# Patient Record
Sex: FEMALE | Race: WHITE | NOT HISPANIC OR LATINO | URBAN - METROPOLITAN AREA
[De-identification: names, ages, dates, MRNs, and addresses within clinical notes are randomized per-mention and may not be internally consistent; named-entity substitution may affect disease eponyms.]

---

## 2018-07-20 ENCOUNTER — EMERGENCY (EMERGENCY)
Facility: HOSPITAL | Age: 63
LOS: 0 days | Discharge: HOME | End: 2018-07-20
Admitting: PHYSICIAN ASSISTANT

## 2018-07-20 VITALS
DIASTOLIC BLOOD PRESSURE: 89 MMHG | SYSTOLIC BLOOD PRESSURE: 173 MMHG | HEART RATE: 81 BPM | TEMPERATURE: 96 F | OXYGEN SATURATION: 97 % | RESPIRATION RATE: 18 BRPM
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
## 2018-07-20 DIAGNOSIS — E78.5 HYPERLIPIDEMIA, UNSPECIFIED: ICD-10-CM
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
## 2018-07-20 DIAGNOSIS — F17.200 NICOTINE DEPENDENCE, UNSPECIFIED, UNCOMPLICATED: ICD-10-CM
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
## 2018-07-20 DIAGNOSIS — M25.561 PAIN IN RIGHT KNEE: ICD-10-CM

## 2018-07-20 RX ORDER — IBUPROFEN 200 MG
600 TABLET ORAL ONCE
Qty: 0 | Refills: 0 | Status: COMPLETED | OUTPATIENT
Start: 2018-07-20 | End: 2018-07-20

## 2018-07-20 RX ADMIN — Medication 600 MILLIGRAM(S): at 11:34

## 2018-07-20 NOTE — ED PROVIDER NOTE - PHYSICAL EXAMINATION
CONSTITUTIONAL: Well-appearing; well-nourished; in no apparent distress.   CARDIOVASCULAR: Normal S1, S2; no murmurs, rubs, or gallops.   RESPIRATORY: Normal chest excursion with respiration; breath sounds clear and equal bilaterally; no wheezes, rhonchi, or rales.  MS: No evidence of trauma or deformity. + full flexion/extension of knee. + mild lateral right patella tenderness. Normal gait. Pedal pulses intact.   SKIN: No abrasions, ecchymosis
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
## 2018-07-20 NOTE — ED PROVIDER NOTE - NS ED ROS FT
Constitutional: no fever, chills, no recent weight loss, change in appetite or malaise  Cardiac: No chest pain, SOB or edema.  Respiratory: No cough or respiratory distress  GI: No nausea, vomiting, diarrhea or abdominal pain.  MS: + right knee pain  Neuro: No decreased sensation  Skin: No skin rash, bruising, abrasions  Except as documented in the HPI, all other systems are negative.

## 2018-07-20 NOTE — ED PROVIDER NOTE - MEDICAL DECISION MAKING DETAILS
Patient here for right knee pain s/p injury, xray unremarkable, will refer to ortho. Offered knee immobilizer or ace wrap but patient declined.

## 2018-07-20 NOTE — ED PROVIDER NOTE - PROGRESS NOTE DETAILS
MARIA A Cohen: I was directly involved in the care and management of this patient while supervising PA Veronica Garrison

## 2018-07-24 PROBLEM — M25.561 PAIN IN RIGHT KNEE: Chronic | Status: ACTIVE | Noted: 2018-07-20

## 2018-07-31 ENCOUNTER — OUTPATIENT (OUTPATIENT)
Dept: OUTPATIENT SERVICES | Facility: HOSPITAL | Age: 63
LOS: 1 days | Discharge: HOME | End: 2018-07-31

## 2018-07-31 DIAGNOSIS — F02.81 DEMENTIA IN OTHER DISEASES CLASSIFIED ELSEWHERE, UNSPECIFIED SEVERITY, WITH BEHAVIORAL DISTURBANCE: ICD-10-CM

## 2018-07-31 DIAGNOSIS — F06.34 MOOD DISORDER DUE TO KNOWN PHYSIOLOGICAL CONDITION WITH MIXED FEATURES: ICD-10-CM

## 2021-02-07 NOTE — ED ADULT TRIAGE NOTE - STATUS:
[facility]  Inpatient Services  Wound and Skin Care Evaluation    Admission Date: 2/1/2021     Last order of IP CONSULT TO WOUND CARE was found on 2/2/2021 from Hospital Encounter on 2/1/2021     HPI, PMH, SH: Reviewed    Past Surgical History:   Procedure Laterality Date   • IRRIGATION & DEBRIDEMENT ORTHO Right 2/2/2021    Procedure: IRRIGATION AND DEBRIDEMENT, WOUND-KNEE;  Surgeon: Kush Sol M.D.;  Location: SURGERY [facility];  Service: Orthopedics   • INCISION AND DRAINAGE GENERAL Right 2/2/2021    Procedure: INCISION AND DRAINAGE- arthrocentesis right knee;  Surgeon: Kush Sol M.D.;  Location: [facility];  Service: Orthopedics   • INCISION AND DRAINAGE GENERAL Right 12/19/2020    Procedure: INCISION AND DRAINAGE;  Surgeon: Marc Chowdhury M.D.;  Location: [facility];  Service: Orthopedics   • IRRIGATION & DEBRIDEMENT ORTHO Right 10/27/2020    Procedure: IRRIGATION AND DEBRIDEMENT, WOUND - KNEE OPEN;  Surgeon: Elijah Faulkner M.D.;  Location: [facility];  Service: Orthopedics   • FLEXOR TENDON REPAIR Left 4/3/2019    Procedure: FLEXOR TENDON REPAIR- SMALL FINGER VS;  Surgeon: Marc Chowdhury M.D.;  Location: [facility];  Service: Orthopedics   • TENDON TRANSFER Left 4/3/2019    Procedure: TENDON TRANSFER;  Surgeon: Marc Chowdhury M.D.;  Location: [facility];  Service: Orthopedics   • COLONOSCOPY  11/10/2018    Procedure: COLONOSCOPY;  Surgeon: Ganesh Peacock M.D.;  Location: [facility];  Service: Gastroenterology   • NASAL POLYPECTOMY Bilateral 10/6/2015    Procedure: NASAL POLYPECTOMY WITH SCOTT ENDOSCOPIC NASAL DEBRIDEMENT;  Surgeon: Param Maurer M.D.;  Location: SURGERY [facility];  Service:    • CYSTOSCOPY  2/1/2010    Performed by ANGIE VERDUZCO at [facility]   • RETROGRADES  2/1/2010    Performed by ANGIE VERDUZCO at [facility]   •  PYELOGRAM  2010    Performed by ANGIE VERDUZCO at SUR[facility]ERY [facility]   • URETEROSCOPY  2010    Performed by ANGIE VERDUZCO at SUR[facility]ERY [facility]   • NEPHRECTOMY LAPAROSCOPIC      left kidney   • TESTICLE EXPLORATION     • PB REMV 2ND CATARACT,CORN-SCLER SECTN     • TONSILLECTOMY       Social History     Tobacco Use   • Smoking status: Former Smoker     Packs/day: 1.00     Years: 40.00     Pack years: 40.00     Types: Cigarettes     Quit date: 1980     Years since quittin.1   • Smokeless tobacco: Never Used   • Tobacco comment: 1 pk a day for 40 yrs   Substance Use Topics   • Alcohol use: No     Alcohol/week: 0.0 oz     Chief Complaint   Patient presents with   • Leg Swelling     has had infection in R leg since feb   • Fever     Diagnosis: Cellulitis [L03.90]    Unit where seen by Wound Team: [id number]/     WOUND CONSULT/FOLLOW UP RELATED TO:  NPWT change to right knee per protocol    WOUND HISTORY:  Patient admitted with complaints of pain to right knee, over several months he has had multiple procedures completed on the wound, followed by wound complications. States this was his 4th surgery on this knee. Had I&D with MD Sol on  and NPWT was placed then.      WOUND ASSESSMENT/LDA          Negative Pressure Wound Therapy 21 Knee Right (Active)   NPWT Pump Mode / Pressure Setting Continuous;125 mmHg    Dressing Type Black Foam (Regular);Medium    Number of Foam Pieces Used 4    Canister Changed No    NEXT Dressing Change/Treatment Date 21              Wound 10/27/20 Incision Knee Right xeroform, 4x4, ace (Active)       Wound 21 Incision Knee Right s/p wound I&D and arthrocentesis with Dr Sol 21 (Active)   Wound Image    21 1600   Site Assessment Pink    Periwound Assessment Maceration;Pink    Margins Defined edges;Unattached edges    Closure Secondary intention    Drainage Amount Small    Drainage Description Serosanguineous       Treatments Cleansed;Site care    Wound Cleansing Approved Wound Cleanser    Periwound Protectant Drape;Hydrofiber;Skin Protectant Wipes to Periwound;Mepitel    Dressing Cleansing/Solutions Not Applicable    Dressing Options Wound Vac    Dressing Changed Changed    Dressing Status Clean;Dry;Intact    Dressing Change/Treatment Frequency Tuesday, Thursday, Saturday, and As Needed    NEXT Dressing Change/Treatment Date 02/09/21    NEXT Weekly Photo (Inpatient Only) 02/11/21    Non-staged Wound Description Full thickness    Shape irregular    Wound Odor None    Exposed Structures Fascia          MEASUREMENTS 2/4/21:  Superior wound: 3 x 1.4 x 0.6CM  Inferior wound: 3 x 1.4 x 0.8CM  Wounds communicate with eachother. Circumferential undermining present for both wounds, ranging from 0.5CM - 3.7CM (deepest point was inferior wound at 0900.          Vascular:    NIKKY:   No results found.    Lab Values:    Lab Results   Component Value Date/Time    WBC 7.1 02/05/2021 05:53 AM    RBC 3.13 (L) 02/05/2021 05:53 AM    HEMOGLOBIN 10.0 (L) 02/05/2021 05:53 AM    HEMATOCRIT 32.4 (L) 02/05/2021 05:53 AM    CREACTPROT 34.20 (H) 02/02/2021 04:59 AM    SEDRATEWES 25 (H) 01/05/2021 10:35 AM        Culture Results show:  Recent Results (from the past 720 hour(s))   CULTURE WOUND W/ GRAM STAIN    Collection Time: 02/02/21  8:33 AM    Specimen: Wound   Result Value Ref Range    Significant Indicator POS (POS)     Source WND     Site Right Prepatellar Fluid     Culture Result - (A)     Gram Stain Result Many WBCs.  Moderate Gram positive cocci.       Culture Result (A)      Staphylococcus aureus  Heavy growth  See previous culture for sensitivity report.         Pain Level/Medicated:  no premed. Tolerated well. 2/10.       INTERVENTIONS BY WOUND TEAM:  Chart and images reviewed. Discussed with bedside RN. This RN in to assess patient. Performed standard wound care which includes appropriate positioning, dressing removal and non-selective  debridement. Pictures and measurements obtained weekly if/when required.  Preparation for Dressing removal: Dressing soaked with wound cleanser  Cleansed with:  wound cleanser and gauze.  Sharp debridement: n/a  Dona wound: Cleansed with wound cleanser and gauze, Prepped with benzoin. Applied hydrofiber silver to macerated area around incisions and open wounds. Mepitel applied over incisions. Drape then applied to periwound over hydrofiber silver.   Primary Dressing: Applied one piece of regular black foam into the superior wound and tucked into some undermining, and applied another separate piece of regular black foam to inferior wound bed and tucked into undermining. Then took one piece of foam and applied over top the entire suture line, secured with drape.   Secondary (Outer) Dressing: Hole cut at distal aspect. 1 piece of foam applied as a button and trac pad applied. Suction obtained at 125mmhg continuous suction, mepilex under tubing. Reapplied immobilizer.     Interdisciplinary consultation: Patient, Bedside RN (Julia Jhaveri)    EVALUATION / RATIONALE FOR TREATMENT:  Most Recent Date:  2/6/21: Wound appears to be clean. Periwound macerated in a few different areas. Hydrofiber silver applied over macerated area. Continued mepitel over sutures to apply an incisional vac. Continued NPWT dressing to assist in closure, management of drainage, increase oxygenation and granulation to area.    2/4/21: Patient right knee with full thickness surgical wound present. Two separate openings noted, however they do communicate with eachother and there is circumferential undermining involved. Tendon visible in superior wound bed.  Mepitel over sutures to protect, and then applied NPWT to assist in closure by secondary intention, management of drainage, increase oxygenation and granulation to the area. Wound team to follow up 3x/week for changes.      Goals: Steady decrease in wound area and depth weekly.    WOUND TEAM PLAN OF  CARE ([X] for frequency of wound follow up,):   Nursing to follow orders written for wound care. Contact wound team if area fails to progress, deteriorates or with any questions/concerns  Dressing changes by wound team:                   Follow up 3 times weekly:                NPWT change 3 times weekly: X    Follow up 1-2 times weekly:      Follow up Bi-Monthly:                   Follow up as needed:     Other (explain):     NURSING PLAN OF CARE ORDERS (X):  Dressing changes: See Dressing Care orders: X  Skin care: See Skin Care orders:   RN Prevention Protocol:  Rectal tube care: See Rectal Tube Care orders:   Other orders:    RSKIN:   CURRENTLY IN PLACE (X), APPLIED THIS VISIT (A), ORDERED (O):   Q shift Silverio:  X  Q shift pressure point assessments:  X    Surface/Positioning   Pressure redistribution mattress   X         Low Airloss          Bariatric foam      Bariatric DELANEY     Waffle cushion        Waffle Overlay          Reposition q 2 hours      TAPs Turning system     Z Syd Pillow     Offloading/Redistribution SARITA  Sacral Mepilex (Silicone dressing)     Heel Mepilex (Silicone dressing)         Heel float boots (Prevalon boot)             Float Heels off Bed with Pillows           Respiratory SARITA  Silicone O2 tubing         Gray Foam Ear protectors     Cannula fixation Device (Tender )          High flow offloading Clip    Elastic head band offloading device      Anchorfast                                                         Trach with Optifoam split foam             Containment/Moisture Prevention SARITA    Rectal tube or BMS    Purwick/Condom Cath        Lozano Catheter    Barrier wipes           Barrier paste       Antifungal tx      Interdry        Mobilization SARITA      Up to chair        Ambulate      PT/OT      Nutrition       Dietician        Diabetes Education      PO  X   TF     TPN     NPO   # days     Other        Anticipated discharge plans: will need ongoing wound vac changes once  Intact discharged.   LTACH:        SNF/Rehab:                  Home Health Care: X          Outpatient Wound Center:            Self/Family Care:        Other: